# Patient Record
Sex: FEMALE | Employment: OTHER | URBAN - METROPOLITAN AREA
[De-identification: names, ages, dates, MRNs, and addresses within clinical notes are randomized per-mention and may not be internally consistent; named-entity substitution may affect disease eponyms.]

---

## 2024-02-09 ENCOUNTER — OFFICE VISIT (OUTPATIENT)
Age: 44
End: 2024-02-09

## 2024-02-09 VITALS — HEIGHT: 65 IN | BODY MASS INDEX: 23.82 KG/M2 | WEIGHT: 143 LBS

## 2024-02-09 DIAGNOSIS — Z12.31 BREAST CANCER SCREENING BY MAMMOGRAM: ICD-10-CM

## 2024-02-09 DIAGNOSIS — N63.20 MASS OF LEFT BREAST, UNSPECIFIED QUADRANT: Primary | ICD-10-CM

## 2024-02-09 PROBLEM — R92.8 ABNORMAL MAMMOGRAM: Status: ACTIVE | Noted: 2024-02-09

## 2024-02-09 NOTE — PROGRESS NOTES
HISTORY OF PRESENT ILLNESS  Susie Marr is a 43 y.o. female     HPI NEW patient consult referred by  Elver Miller for LEFT breast lump.    Patient new to area.  Here to establish care after breast procedures    6/28/23 Left breast lump @ 3 o 'clock 5 cmfn. Biopsy done in [place].  Patient states results were benign.      Family History:  NONE      Mammogram, 6/7/23, BIRADS 0  6/13/23-(LEFT) BI-RADS 4    Biopsy-June 28, 2023-LEFT breast benign    Review of Systems      Physical Exam       ASSESSMENT and PLAN  {Assessment and Plan Chronic Disease:[id number]}    
Pupils are equal, round, and reactive to light.   Cardiovascular:      Rate and Rhythm: Normal rate and regular rhythm.   Pulmonary:      Breath sounds: Normal breath sounds.   Chest:   Breasts:     Right: Normal.      Left: Normal.   Abdominal:      Palpations: Abdomen is soft.   Musculoskeletal:         General: Normal range of motion.      Cervical back: Normal range of motion and neck supple.   Skin:     General: Skin is warm.   Neurological:      Mental Status: She is alert and oriented to person, place, and time.   Psychiatric:         Behavior: Behavior normal.         Thought Content: Thought content normal.         Judgment: Judgment normal.       BREAST ULTRASOUND  Indication: LEFT breast mass  Technique: The area was scanned using a high-frequency linear-array near-field transducer  Findings: cluster of benign cysts at 10:00 and 0.57 x 0.64 x 0.83 cm hypoechoic mass with biopsy clip at 3:00 on LEFT breast  Impression: Dense breast tissue stable mass  Disposition: Annual BILATERAL mammograms      ASSESSMENT and PLAN   Diagnosis Orders   1. Mass of left breast, unspecified quadrant        2. Breast cancer screening by mammogram  [facility] DIGITAL SCREEN BILATERAL        New patient presents for evaluation of LEFT breast mass, and is doing well overall. Nothing palpable on physical exam. US visualizes a cluster of benign cysts at 10:00 and a 0.57 x 0.64 x 0.83 cm hypoechoic mass with biopsy clip at 3:00 on the LEFT breast. Patient will get annual mammograms. I provided her with a mammogram order to complete in June 2024.     F/U PRN. This plan was reviewed with the patient and patient agrees. All questions were answered.    Total time spent excluding procedural time was 40 minutes.    I, Dr. Jaime Avendano, personally performed the services described in this document as scribed by Loretta Thacker in my presence, and it is both accurate and complete.